# Patient Record
Sex: MALE | Race: WHITE | URBAN - METROPOLITAN AREA
[De-identification: names, ages, dates, MRNs, and addresses within clinical notes are randomized per-mention and may not be internally consistent; named-entity substitution may affect disease eponyms.]

---

## 2021-02-25 ENCOUNTER — NEW PATIENT (OUTPATIENT)
Dept: URBAN - METROPOLITAN AREA CLINIC 51 | Facility: CLINIC | Age: 61
End: 2021-02-25
Payer: COMMERCIAL

## 2021-02-25 DIAGNOSIS — E11.9 TYPE 2 DIABETES MELLITUS WITHOUT COMPLICATIONS: Primary | ICD-10-CM

## 2021-02-25 DIAGNOSIS — H25.13 AGE-RELATED NUCLEAR CATARACT, BILATERAL: ICD-10-CM

## 2021-02-25 PROCEDURE — 92250 FUNDUS PHOTOGRAPHY W/I&R: CPT | Performed by: OPTOMETRIST

## 2021-02-25 PROCEDURE — 99204 OFFICE O/P NEW MOD 45 MIN: CPT | Performed by: OPTOMETRIST

## 2021-02-25 ASSESSMENT — KERATOMETRY
OD: 42.01
OS: 42.42

## 2021-02-25 ASSESSMENT — INTRAOCULAR PRESSURE
OS: 12
OD: 10

## 2021-02-25 ASSESSMENT — VISUAL ACUITY
OD: 20/20
OS: 20/20

## 2021-03-18 ENCOUNTER — FOLLOW UP ESTABLISHED (OUTPATIENT)
Dept: URBAN - METROPOLITAN AREA CLINIC 51 | Facility: CLINIC | Age: 61
End: 2021-03-18
Payer: COMMERCIAL

## 2021-03-18 DIAGNOSIS — H21.233 DEGENERATION OF IRIS (PIGMENTARY), BILATERAL: Primary | ICD-10-CM

## 2021-03-18 PROCEDURE — 92133 CPTRZD OPH DX IMG PST SGM ON: CPT | Performed by: OPTOMETRIST

## 2021-03-18 PROCEDURE — 76514 ECHO EXAM OF EYE THICKNESS: CPT | Performed by: OPTOMETRIST

## 2021-03-18 PROCEDURE — 92012 INTRM OPH EXAM EST PATIENT: CPT | Performed by: OPTOMETRIST

## 2021-03-18 PROCEDURE — 92083 EXTENDED VISUAL FIELD XM: CPT | Performed by: OPTOMETRIST

## 2021-03-18 ASSESSMENT — INTRAOCULAR PRESSURE
OS: 17
OD: 18

## 2021-09-22 ENCOUNTER — OFFICE VISIT (OUTPATIENT)
Dept: URBAN - METROPOLITAN AREA CLINIC 51 | Facility: CLINIC | Age: 61
End: 2021-09-22
Payer: COMMERCIAL

## 2021-09-22 DIAGNOSIS — H52.4 PRESBYOPIA: ICD-10-CM

## 2021-09-22 DIAGNOSIS — Z79.84 LONG TERM (CURRENT) USE OF ORAL ANTIDIABETIC DRUGS: ICD-10-CM

## 2021-09-22 DIAGNOSIS — E11.3292 TYPE 2 DIAB W MILD NONPRLF DIABETIC RTNOP W/O MACULAR EDEMA, LEFT EYE: Primary | ICD-10-CM

## 2021-09-22 PROCEDURE — 92134 CPTRZ OPH DX IMG PST SGM RTA: CPT | Performed by: OPTOMETRIST

## 2021-09-22 PROCEDURE — 92250 FUNDUS PHOTOGRAPHY W/I&R: CPT | Performed by: OPTOMETRIST

## 2021-09-22 PROCEDURE — 92014 COMPRE OPH EXAM EST PT 1/>: CPT | Performed by: OPTOMETRIST

## 2021-09-22 PROCEDURE — 92083 EXTENDED VISUAL FIELD XM: CPT | Performed by: OPTOMETRIST

## 2021-09-22 PROCEDURE — 92133 CPTRZD OPH DX IMG PST SGM ON: CPT | Performed by: OPTOMETRIST

## 2021-09-22 ASSESSMENT — KERATOMETRY
OS: 42.27
OD: 41.95

## 2021-09-22 ASSESSMENT — VISUAL ACUITY
OS: 20/20
OD: 20/20

## 2021-09-22 ASSESSMENT — INTRAOCULAR PRESSURE
OS: 16
OD: 17

## 2021-09-22 NOTE — IMPRESSION/PLAN
Impression: Type 2 diab w mild nonprlf diabetic rtnop w/o macular edema, left eye: M58.3687. *No diabetic retinopathy or macular edema OD Plan: Mild Non-Proliferative Diabetic Retinopathy OS only, no Diabetic Macular Edema and no Neovascularization of the iris, disc, or elsewhere. Discussed ocular and systemic benefits of blood sugar control. Continue f/u care with PCP, letter sent. 1 year Diabetic eye exam + MAC OCT + FUNDUS PHOTOS.

## 2021-09-22 NOTE — IMPRESSION/PLAN
Impression: Age-related nuclear cataract, bilateral Plan: Educated patient on findings. Cataracts are mild and not visually significant or effecting ADLs/vision at this time. No treatment is currently warranted due to current level of vision. Patient will monitor vision closely and contact our office with any changes/ worsening in vision. Will re-evaluate on return visit.

## 2021-09-22 NOTE — IMPRESSION/PLAN
Impression: Degeneration of iris (pigmentary), bilateral
*RNFL (03/18/2021) OD: abn (12), OS: bdl (12) *GCC: WNL OU *HVF 24-2 (03/18/2021) OD: sup edge pts OS: sup/nasal edge pts *Pachymetry (03/18/2021) 547/541 Plan: IOPs today 17/16 w/o meds RNFL (09/22/2021) OD: bdl sup ; OS: abn sup HVF 24-2 (09/22/2021) OD: superior peripheral pts, no glc defect OS: superior edge pts, no glc defect RNFL shows borderline findings superior OS>OD, with mild chg in thickness. Educated patient on risk for glaucoma and will continue to monitor closely. No treatment at this time. 
RTC in 6 months for CE + 24-2 HVF + RNFL

## 2021-09-22 NOTE — IMPRESSION/PLAN
Impression: Corneal scar: H17.9. Plan: Old FB scar from foreign body (metal) removal. Recommend AT's for comfort. Not affecting BCVA.

## 2022-03-22 ENCOUNTER — OFFICE VISIT (OUTPATIENT)
Dept: URBAN - METROPOLITAN AREA CLINIC 51 | Facility: CLINIC | Age: 62
End: 2022-03-22
Payer: COMMERCIAL

## 2022-03-22 DIAGNOSIS — H17.9 CORNEAL SCAR: ICD-10-CM

## 2022-03-22 DIAGNOSIS — E11.3291 TYPE 2 DIAB W MILD NONPRLF DIABETIC RTNOP W/O MACULAR EDEMA, RIGHT EYE: Primary | ICD-10-CM

## 2022-03-22 DIAGNOSIS — H25.811 COMBINED FORMS OF AGE-RELATED CATARACT, RIGHT EYE: ICD-10-CM

## 2022-03-22 DIAGNOSIS — H43.313 VITREOUS MEMBRANES AND STRANDS, BILATERAL: ICD-10-CM

## 2022-03-22 DIAGNOSIS — H25.12 AGE-RELATED NUCLEAR CATARACT, LEFT EYE: ICD-10-CM

## 2022-03-22 DIAGNOSIS — E11.3212 TYPE 2 DIAB W MILD NONPRLF DIABETIC RTNOP W MACULAR EDEMA, LEFT EYE: ICD-10-CM

## 2022-03-22 PROCEDURE — 92134 CPTRZ OPH DX IMG PST SGM RTA: CPT | Performed by: OPTOMETRIST

## 2022-03-22 PROCEDURE — 92250 FUNDUS PHOTOGRAPHY W/I&R: CPT | Performed by: OPTOMETRIST

## 2022-03-22 PROCEDURE — 92014 COMPRE OPH EXAM EST PT 1/>: CPT | Performed by: OPTOMETRIST

## 2022-03-22 PROCEDURE — 92133 CPTRZD OPH DX IMG PST SGM ON: CPT | Performed by: OPTOMETRIST

## 2022-03-22 PROCEDURE — 92083 EXTENDED VISUAL FIELD XM: CPT | Performed by: OPTOMETRIST

## 2022-03-22 ASSESSMENT — KERATOMETRY
OD: 42.13
OS: 42.56

## 2022-03-22 ASSESSMENT — INTRAOCULAR PRESSURE
OS: 16
OD: 17

## 2022-03-22 ASSESSMENT — VISUAL ACUITY
OD: 20/20
OS: 20/20

## 2022-03-22 NOTE — IMPRESSION/PLAN
Impression: Degeneration of iris (pigmentary), bilateral
*RNFL (09/22/2021) OD: bdl sup ; OS: abn sup *RNFL (03/18/2021) OD: abn (12), OS: bdl (12) *GCC: WNL OU *HVF 24-2 (09/22/2021) OD: superior peripheral pts, no glc defect OS: superior edge pts, no glc defect *HVF 24-2 (03/18/2021) OD: sup edge pts OS: sup/nasal edge pts *Pachymetry (03/18/2021) 547/541 Plan: IOPs today 17/16 w/o meds RNFL OCT (03/22/2022): bdl sup, suggests WNL overall OU
GCA OCT (03/22/2022): WNL OU
HVF 24-2 (03/22/2022) OD: essentially full, edge pts  ;   OS: essentially full Diagnostic testing remains stable since last year. Educated patient on risk for glaucoma and will continue to monitor closely. No treatment at this time. Continue to monitor with HVF + RNFL/GCA.

## 2022-03-22 NOTE — IMPRESSION/PLAN
Impression: Combined forms of age-related cataract, right eye: H25.811. Plan: Educated patient on condition and findings OD>OS. Cataracts are not visually significant or effecting ADLs/vision. No treatment is currently warranted due to current level of vision. Patient to contact our office with any changes/ worsening in vision. Will re-evaluate on return visit.

## 2022-03-22 NOTE — IMPRESSION/PLAN
Impression: Type 2 diab w mild nonprlf diabetic rtnop w/o macular edema, right eye: L40.1694. Plan: Mild Non-Proliferative Diabetic Retinopathy, no DME OD. MAC OCT and FUNDUS PHOTOS taken today. Discussed ocular and systemic benefits of blood sugar control. Continue f/u care with PCP. Send report to PCP. RTC in 2-3 months for CE + MAC OCT + FUNDUS PHOTOS.

## 2022-03-22 NOTE — IMPRESSION/PLAN
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 Plan: Mild Non-Proliferative Diabetic Retinopathy with mild DME OS. MAC OCT and FUNDUS PHOTOS taken today. Discussed ocular and systemic benefits of blood sugar control. Continue f/u care with PCP. Send report to PCP. RTC in 2-3 months for CE + MAC OCT + FUNDUS PHOTOS.

## 2022-03-22 NOTE — IMPRESSION/PLAN
Impression: Corneal scar: H17.9. Plan: Old FB scar from foreign body (metal) removal. Not affecting BCVA. AT's for comfort.

## 2022-03-22 NOTE — IMPRESSION/PLAN
Impression: Age-related nuclear cataract, left eye: H25.12. Plan: Educated patient on condition and findings OD>OS. Cataracts are not visually significant or effecting ADLs/vision. No treatment is currently warranted due to current level of vision. Patient to contact our office with any changes/ worsening in vision. Will re-evaluate on return visit.

## 2022-06-15 ENCOUNTER — OFFICE VISIT (OUTPATIENT)
Dept: URBAN - METROPOLITAN AREA CLINIC 51 | Facility: CLINIC | Age: 62
End: 2022-06-15
Payer: COMMERCIAL

## 2022-06-15 DIAGNOSIS — H52.4 PRESBYOPIA: ICD-10-CM

## 2022-06-15 DIAGNOSIS — H25.811 COMBINED FORMS OF AGE-RELATED CATARACT, RIGHT EYE: ICD-10-CM

## 2022-06-15 DIAGNOSIS — Z79.84 LONG TERM (CURRENT) USE OF ORAL ANTIDIABETIC DRUGS: ICD-10-CM

## 2022-06-15 DIAGNOSIS — H25.12 AGE-RELATED NUCLEAR CATARACT, LEFT EYE: ICD-10-CM

## 2022-06-15 DIAGNOSIS — E11.3212 TYPE 2 DIAB W MILD NONPRLF DIABETIC RTNOP W MACULAR EDEMA, LEFT EYE: Primary | ICD-10-CM

## 2022-06-15 DIAGNOSIS — H43.313 VITREOUS MEMBRANES AND STRANDS, BILATERAL: ICD-10-CM

## 2022-06-15 DIAGNOSIS — H21.233 DEGENERATION OF IRIS (PIGMENTARY), BILATERAL: ICD-10-CM

## 2022-06-15 DIAGNOSIS — E11.3291 TYPE 2 DIAB W MILD NONPRLF DIABETIC RTNOP W/O MACULAR EDEMA, RIGHT EYE: ICD-10-CM

## 2022-06-15 DIAGNOSIS — H17.9 CORNEAL SCAR: ICD-10-CM

## 2022-06-15 PROCEDURE — 92134 CPTRZ OPH DX IMG PST SGM RTA: CPT | Performed by: OPTOMETRIST

## 2022-06-15 PROCEDURE — 92014 COMPRE OPH EXAM EST PT 1/>: CPT | Performed by: OPTOMETRIST

## 2022-06-15 PROCEDURE — 92250 FUNDUS PHOTOGRAPHY W/I&R: CPT | Performed by: OPTOMETRIST

## 2022-06-15 RX ORDER — METFORMIN HYDROCHLORIDE 500 MG/1
500 MG TABLET, FILM COATED ORAL
Qty: 0 | Refills: 0 | Status: ACTIVE
Start: 2022-06-15

## 2022-06-15 ASSESSMENT — VISUAL ACUITY
OS: 20/20
OD: 20/20

## 2022-06-15 ASSESSMENT — INTRAOCULAR PRESSURE
OS: 14
OD: 15

## 2022-06-15 NOTE — IMPRESSION/PLAN
Impression: Age-related nuclear cataract, left eye: H25.12. Plan: Educated patient on condition and findings OD>OS. Cataracts are not visually significant or effecting ADLs/vision. No treatment is currently warranted due to current level of vision. Patient to contact our office with any changes/ worsening in vision. Monitor.
Impression: Combined forms of age-related cataract, right eye: H25.811. Plan: Educated patient on condition and findings OD>OS. Cataracts are not visually significant or effecting ADLs/vision. No treatment is currently warranted due to current level of vision. Patient to contact our office with any changes/ worsening in vision. Monitor.
Impression: Corneal scar: H17.9. Plan: Old FB scar from foreign body (metal) removal. Not affecting BCVA. AT's for comfort. Monitor.
Impression: Degeneration of iris (pigmentary), bilateral
*RNFL OCT (03/22/2022): bdl sup, suggests WNL overall OU *RNFL (09/22/2021) OD: bdl sup ; OS: abn sup *RNFL (03/18/2021) OD: abn (12), OS: bdl (12) *GCA OCT (03/22/2022): WNL OU *GCC: WNL OU *HVF 24-2 (03/22/2022) OD: essentially full, edge pts  ;   OS: essentially full *HVF 24-2 (09/22/2021) OD: superior peripheral pts, no glc defect OS: superior edge pts, no glc defect *HVF 24-2 (03/18/2021) OD: sup edge pts OS: sup/nasal edge pts *Pachymetry (03/18/2021) 547/541 Plan: IOPs today: 15/14 Reviewed today's findings. IOP remains stable. Educated patient on risk for glaucoma and will continue to monitor closely. No treatment at this time. 
Will obtain updated RNFL/GCA at next visit in 5-6 months
Impression: Long term (current) use of oral antidiabetic drugs Plan: See other diabetic plan.
Impression: Presbyopia: H52.4. Plan: Patient is satisfied with current level of vision and declined new SRx.
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 Plan: Mild Non-Proliferative Diabetic Retinopathy with mild DME OS. MAC OCT and FUNDUS PHOTOS taken today. Discussed ocular and systemic benefits of blood sugar control. Continue f/u care with PCP. Send report to PCP. RTC in 5-6 months for CE + MAC OCT + FUNDUS PHOTOS.
Impression: Type 2 diab w mild nonprlf diabetic rtnop w/o macular edema, right eye: I91.7025. Plan: Mild Non-Proliferative Diabetic Retinopathy, no DME OD. MAC OCT and FUNDUS PHOTOS taken today. Discussed ocular and systemic benefits of blood sugar control. Continue f/u care with PCP. Send report to PCP. RTC in 5-6 months for CE + MAC OCT + FUNDUS PHOTOS.
Impression: Vitreous membranes and strands, bilateral: H43.313. Plan: Retina is attached 360 degrees. Patient to RTC ASAP if increase in floaters, flashes, or curtain or veil taking away vision. Monitor.
Detail Level: Detailed
Quality 111:Pneumonia Vaccination Status For Older Adults: Pneumococcal Vaccination Previously Received
Quality 130: Documentation Of Current Medications In The Medical Record: Current Medications Documented

## 2022-11-18 ENCOUNTER — OFFICE VISIT (OUTPATIENT)
Dept: URBAN - METROPOLITAN AREA CLINIC 51 | Facility: CLINIC | Age: 62
End: 2022-11-18
Payer: COMMERCIAL

## 2022-11-18 DIAGNOSIS — H25.811 COMBINED FORMS OF AGE-RELATED CATARACT, RIGHT EYE: ICD-10-CM

## 2022-11-18 DIAGNOSIS — H21.233 DEGENERATION OF IRIS (PIGMENTARY), BILATERAL: ICD-10-CM

## 2022-11-18 DIAGNOSIS — Z79.84 LONG TERM (CURRENT) USE OF ORAL ANTIDIABETIC DRUGS: ICD-10-CM

## 2022-11-18 DIAGNOSIS — H17.9 CORNEAL SCAR: ICD-10-CM

## 2022-11-18 DIAGNOSIS — E11.3212 TYPE 2 DIAB W MILD NONPRLF DIABETIC RTNOP W MACULAR EDEMA, LEFT EYE: Primary | ICD-10-CM

## 2022-11-18 DIAGNOSIS — H43.313 VITREOUS MEMBRANES AND STRANDS, BILATERAL: ICD-10-CM

## 2022-11-18 DIAGNOSIS — E11.3291 TYPE 2 DIAB W MILD NONPRLF DIABETIC RTNOP W/O MACULAR EDEMA, RIGHT EYE: ICD-10-CM

## 2022-11-18 DIAGNOSIS — H25.12 AGE-RELATED NUCLEAR CATARACT, LEFT EYE: ICD-10-CM

## 2022-11-18 PROCEDURE — 92133 CPTRZD OPH DX IMG PST SGM ON: CPT | Performed by: OPTOMETRIST

## 2022-11-18 PROCEDURE — 99214 OFFICE O/P EST MOD 30 MIN: CPT | Performed by: OPTOMETRIST

## 2022-11-18 PROCEDURE — 92134 CPTRZ OPH DX IMG PST SGM RTA: CPT | Performed by: OPTOMETRIST

## 2022-11-18 ASSESSMENT — INTRAOCULAR PRESSURE
OS: 14
OD: 14

## 2022-11-18 ASSESSMENT — VISUAL ACUITY
OD: 20/20
OS: 20/20

## 2022-11-18 ASSESSMENT — KERATOMETRY
OD: 41.77
OS: 42.32

## 2022-11-18 NOTE — IMPRESSION/PLAN
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 Plan: Mild Non-Proliferative Diabetic Retinopathy, no DME OD. MAC OCT taken today. Discussed ocular and systemic benefits of blood sugar control. Continue f/u care with PCP. Send notes to PCP. Monitor with MAC OCT + FUNDUS PHOTOS.

## 2022-11-18 NOTE — IMPRESSION/PLAN
Impression: Degeneration of iris (pigmentary), bilateral
*RNFL OCT (03/22/2022): bdl sup, suggests WNL overall OU *RNFL (09/22/2021) OD: bdl sup ; OS: abn sup *RNFL (03/18/2021) OD: abn (12), OS: bdl (12) *GCA OCT (03/22/2022): WNL OU *GCC: WNL OU *HVF 24-2 (03/22/2022) OD: essentially full, edge pts  ;   OS: essentially full *HVF 24-2 (09/22/2021) OD: superior peripheral pts, no glc defect OS: superior edge pts, no glc defect *HVF 24-2 (03/18/2021) OD: sup edge pts OS: sup/nasal edge pts *Pachymetry (03/18/2021) 547/541 Plan: IOPs today: 14/14 RNFL OCT (11/18/2022): poor scan - not over nerve OU
GCA OCT (11/18/2022): WNL OU Reviewed today's findings. IOP remains stable. RNFL OCT poor scan/unreliable. Educated patient on risk for glaucoma and will continue to monitor closely. No treatment at this time. Will obtain updated HVF at next visit. 

RTC in 6 months for 24-2c HVF + IOP check

## 2022-11-18 NOTE — IMPRESSION/PLAN
Impression: Vitreous membranes and strands, bilateral: H43.313. Plan: Retina is attached 360 degrees with no signs of any retinal holes, tears or detachments. Patient to RTC ASAP if increase in floaters, flashes, or curtain or veil taking away vision. Monitor.

## 2022-11-18 NOTE — IMPRESSION/PLAN
Impression: Combined forms of age-related cataract, right eye: H25.811. Plan: Educated patient on condition and findings OD>OS. Cataracts are not visually significant or effecting ADLs/vision enough to warrant surgical intervention at this time. Patient to contact our office with any changes/ worsening in vision. Monitor.

## 2022-11-18 NOTE — IMPRESSION/PLAN
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 Plan: Mild Non-Proliferative Diabetic Retinopathy with mild worsening DME OS. DME is not having any affect on patients vision, however, due no regression since onset 3/2022; recommend referring to retina for further evaluation. MAC OCT taken today. Discussed ocular and systemic benefits of blood sugar control. Continue f/u care with PCP. Send report to PCP. Refer to retina for further evaluation

## 2022-11-18 NOTE — IMPRESSION/PLAN
Impression: Corneal scar: H17.9. Plan: Old FB scar from foreign body (metal) removal. Not affecting BCVA. Recommend AT's for comfort. Monitor.

## 2022-11-18 NOTE — IMPRESSION/PLAN
Impression: Age-related nuclear cataract, left eye: H25.12. Plan: Educated patient on condition and findings OD>OS. Cataracts are not visually significant or effecting ADLs/vision enough to warrant surgical intervention at this time. Patient to contact our office with any changes/ worsening in vision. Monitor.

## 2023-01-03 ENCOUNTER — OFFICE VISIT (OUTPATIENT)
Dept: URBAN - METROPOLITAN AREA CLINIC 51 | Facility: CLINIC | Age: 63
End: 2023-01-03
Payer: COMMERCIAL

## 2023-01-03 DIAGNOSIS — H25.13 AGE-RELATED NUCLEAR CATARACT, BILATERAL: ICD-10-CM

## 2023-01-03 DIAGNOSIS — E11.3312 TYPE 2 DIAB W MODERATE NONPRLF DIAB RTNOP W MACULAR EDEMA, LEFT EYE: Primary | ICD-10-CM

## 2023-01-03 DIAGNOSIS — E11.3391 TYPE 2 DIAB W MODERATE NONPRLF DIAB RTNOP W/O MACULAR EDEMA, RIGHT EYE: ICD-10-CM

## 2023-01-03 DIAGNOSIS — H43.393 OTHER VITREOUS OPACITIES, BILATERAL: ICD-10-CM

## 2023-01-03 DIAGNOSIS — E11.39 TYPE 2 DIABETES W OTH DIABETIC OPHTHALMIC COMPLICATION: ICD-10-CM

## 2023-01-03 PROCEDURE — 92004 COMPRE OPH EXAM NEW PT 1/>: CPT | Performed by: OPHTHALMOLOGY

## 2023-01-03 PROCEDURE — 92134 CPTRZ OPH DX IMG PST SGM RTA: CPT | Performed by: OPHTHALMOLOGY

## 2023-01-03 PROCEDURE — 92235 FLUORESCEIN ANGRPH MLTIFRAME: CPT | Performed by: OPHTHALMOLOGY

## 2023-01-03 ASSESSMENT — INTRAOCULAR PRESSURE
OD: 12
OS: 10

## 2023-01-03 NOTE — IMPRESSION/PLAN
Impression: Type 2 diabetes w oth diabetic ophthalmic complication: S58.26. Plan: Reviewed the presence of diabetic retinopathy. control DM to reduce the risks of progression.  some carotid suspicion, recommend workup with PCP

## 2023-01-03 NOTE — IMPRESSION/PLAN
Impression: Type 2 diab w moderate nonprlf diab rtnop w macular edema, left eye: Y95.4100. Plan: mild edema, no NV. explained in detail with patient, recommend observation for now. call if any changes RTC 6 month poss FA OS

## 2023-01-03 NOTE — IMPRESSION/PLAN
Impression: Type 2 diab w moderate nonprlf diab rtnop w/o macular edema, right eye: T01.7278.  Plan: no edema, no NV. monitor for now

## 2024-06-04 ENCOUNTER — OFFICE VISIT (OUTPATIENT)
Dept: URBAN - METROPOLITAN AREA CLINIC 51 | Facility: CLINIC | Age: 64
End: 2024-06-04
Payer: COMMERCIAL

## 2024-06-04 DIAGNOSIS — E11.3312 TYPE 2 DIABETES MELLITUS WITH MODERATE NONPROLIFERATIVE DIABETIC RETINOPATHY WITH MACULAR EDEMA, LEFT EYE: Primary | ICD-10-CM

## 2024-06-04 DIAGNOSIS — H25.13 AGE-RELATED NUCLEAR CATARACT, BILATERAL: ICD-10-CM

## 2024-06-04 DIAGNOSIS — Z79.84 LONG TERM (CURRENT) USE OF ORAL ANTIDIABETIC DRUGS: ICD-10-CM

## 2024-06-04 DIAGNOSIS — E11.3391 TYPE 2 DIAB W MODERATE NONPRLF DIAB RTNOP W/O MACULAR EDEMA, RIGHT EYE: ICD-10-CM

## 2024-06-04 DIAGNOSIS — H21.233 DEGENERATION OF IRIS (PIGMENTARY), BILATERAL: ICD-10-CM

## 2024-06-04 DIAGNOSIS — H43.313 VITREOUS MEMBRANES AND STRANDS, BILATERAL: ICD-10-CM

## 2024-06-04 DIAGNOSIS — H17.9 CORNEAL SCAR: ICD-10-CM

## 2024-06-04 DIAGNOSIS — E11.39 TYPE 2 DIABETES W OTH DIABETIC OPHTHALMIC COMPLICATION: ICD-10-CM

## 2024-06-04 PROCEDURE — 99214 OFFICE O/P EST MOD 30 MIN: CPT | Performed by: OPTOMETRIST

## 2024-06-04 PROCEDURE — 92134 CPTRZ OPH DX IMG PST SGM RTA: CPT | Performed by: OPTOMETRIST

## 2024-06-04 ASSESSMENT — VISUAL ACUITY
OD: 20/25
OS: 20/20

## 2024-06-04 ASSESSMENT — INTRAOCULAR PRESSURE
OS: 14
OD: 15

## 2024-07-02 ENCOUNTER — OFFICE VISIT (OUTPATIENT)
Dept: URBAN - METROPOLITAN AREA CLINIC 51 | Facility: CLINIC | Age: 64
End: 2024-07-02
Payer: COMMERCIAL

## 2024-07-02 DIAGNOSIS — H25.13 AGE-RELATED NUCLEAR CATARACT, BILATERAL: ICD-10-CM

## 2024-07-02 DIAGNOSIS — H34.8320 TRIB RTNL VEIN OCCLUSION, LEFT EYE, WITH MACULAR EDEMA: Primary | ICD-10-CM

## 2024-07-02 DIAGNOSIS — E11.3391 TYPE 2 DIAB W MODERATE NONPRLF DIAB RTNOP W/O MACULAR EDEMA, RIGHT EYE: ICD-10-CM

## 2024-07-02 DIAGNOSIS — E11.3312 TYPE 2 DIAB W MODERATE NONPRLF DIAB RTNOP W MACULAR EDEMA, LEFT EYE: ICD-10-CM

## 2024-07-02 PROCEDURE — 92235 FLUORESCEIN ANGRPH MLTIFRAME: CPT | Performed by: OPHTHALMOLOGY

## 2024-07-02 PROCEDURE — 67028 INJECTION EYE DRUG: CPT | Performed by: OPHTHALMOLOGY

## 2024-07-02 PROCEDURE — 92014 COMPRE OPH EXAM EST PT 1/>: CPT | Performed by: OPHTHALMOLOGY

## 2024-07-02 PROCEDURE — 92134 CPTRZ OPH DX IMG PST SGM RTA: CPT | Performed by: OPHTHALMOLOGY

## 2024-07-02 ASSESSMENT — INTRAOCULAR PRESSURE
OS: 9
OD: 14

## 2024-07-30 ENCOUNTER — OFFICE VISIT (OUTPATIENT)
Dept: URBAN - METROPOLITAN AREA CLINIC 51 | Facility: CLINIC | Age: 64
End: 2024-07-30
Payer: COMMERCIAL

## 2024-07-30 DIAGNOSIS — E11.3312 TYPE 2 DIABETES MELLITUS WITH MODERATE NONPROLIFERATIVE DIABETIC RETINOPATHY WITH MACULAR EDEMA, LEFT EYE: ICD-10-CM

## 2024-07-30 DIAGNOSIS — H34.8320 TRIB RTNL VEIN OCCLUSION, LEFT EYE, WITH MACULAR EDEMA: Primary | ICD-10-CM

## 2024-07-30 PROCEDURE — 67028 INJECTION EYE DRUG: CPT | Performed by: OPHTHALMOLOGY

## 2024-07-30 PROCEDURE — 92134 CPTRZ OPH DX IMG PST SGM RTA: CPT | Performed by: OPHTHALMOLOGY

## 2024-07-30 ASSESSMENT — INTRAOCULAR PRESSURE
OS: 11
OD: 14

## 2024-08-27 ENCOUNTER — OFFICE VISIT (OUTPATIENT)
Dept: URBAN - METROPOLITAN AREA CLINIC 51 | Facility: CLINIC | Age: 64
End: 2024-08-27
Payer: COMMERCIAL

## 2024-08-27 DIAGNOSIS — E11.3312 TYPE 2 DIABETES MELLITUS WITH MODERATE NONPROLIFERATIVE DIABETIC RETINOPATHY WITH MACULAR EDEMA, LEFT EYE: ICD-10-CM

## 2024-08-27 DIAGNOSIS — H34.8320 TRIB RTNL VEIN OCCLUSION, LEFT EYE, WITH MACULAR EDEMA: Primary | ICD-10-CM

## 2024-08-27 PROCEDURE — 92134 CPTRZ OPH DX IMG PST SGM RTA: CPT | Performed by: OPHTHALMOLOGY

## 2024-08-27 PROCEDURE — 67028 INJECTION EYE DRUG: CPT | Performed by: OPHTHALMOLOGY

## 2024-08-27 ASSESSMENT — INTRAOCULAR PRESSURE
OS: 10
OD: 7

## 2024-09-24 ENCOUNTER — OFFICE VISIT (OUTPATIENT)
Dept: URBAN - METROPOLITAN AREA CLINIC 51 | Facility: CLINIC | Age: 64
End: 2024-09-24
Payer: COMMERCIAL

## 2024-09-24 DIAGNOSIS — H34.8320 TRIB RTNL VEIN OCCLUSION, LEFT EYE, WITH MACULAR EDEMA: Primary | ICD-10-CM

## 2024-09-24 DIAGNOSIS — E11.3312 TYPE 2 DIAB W MODERATE NONPRLF DIAB RTNOP W MACULAR EDEMA, LEFT EYE: ICD-10-CM

## 2024-09-24 DIAGNOSIS — E11.3391 TYPE 2 DIAB W MODERATE NONPRLF DIAB RTNOP W/O MACULAR EDEMA, RIGHT EYE: ICD-10-CM

## 2024-09-24 DIAGNOSIS — H25.13 AGE-RELATED NUCLEAR CATARACT, BILATERAL: ICD-10-CM

## 2024-09-24 PROCEDURE — 92235 FLUORESCEIN ANGRPH MLTIFRAME: CPT | Performed by: OPHTHALMOLOGY

## 2024-09-24 PROCEDURE — 92014 COMPRE OPH EXAM EST PT 1/>: CPT | Performed by: OPHTHALMOLOGY

## 2024-09-24 PROCEDURE — 67028 INJECTION EYE DRUG: CPT | Performed by: OPHTHALMOLOGY

## 2024-09-24 PROCEDURE — 92134 CPTRZ OPH DX IMG PST SGM RTA: CPT | Performed by: OPHTHALMOLOGY

## 2024-09-24 ASSESSMENT — INTRAOCULAR PRESSURE
OD: 7
OS: 9

## 2024-10-22 ENCOUNTER — OFFICE VISIT (OUTPATIENT)
Dept: URBAN - METROPOLITAN AREA CLINIC 51 | Facility: CLINIC | Age: 64
End: 2024-10-22
Payer: COMMERCIAL

## 2024-10-22 DIAGNOSIS — E11.3312 TYPE 2 DIABETES MELLITUS WITH MODERATE NONPROLIFERATIVE DIABETIC RETINOPATHY WITH MACULAR EDEMA, LEFT EYE: ICD-10-CM

## 2024-10-22 DIAGNOSIS — H34.8320 TRIB RTNL VEIN OCCLUSION, LEFT EYE, WITH MACULAR EDEMA: Primary | ICD-10-CM

## 2024-10-22 PROCEDURE — 92134 CPTRZ OPH DX IMG PST SGM RTA: CPT | Performed by: OPHTHALMOLOGY

## 2024-10-22 PROCEDURE — 67028 INJECTION EYE DRUG: CPT | Performed by: OPHTHALMOLOGY

## 2024-10-22 ASSESSMENT — INTRAOCULAR PRESSURE
OS: 15
OD: 13

## 2024-11-19 ENCOUNTER — OFFICE VISIT (OUTPATIENT)
Dept: URBAN - METROPOLITAN AREA CLINIC 51 | Facility: CLINIC | Age: 64
End: 2024-11-19
Payer: COMMERCIAL

## 2024-11-19 DIAGNOSIS — H34.8320 TRIB RTNL VEIN OCCLUSION, LEFT EYE, WITH MACULAR EDEMA: Primary | ICD-10-CM

## 2024-11-19 DIAGNOSIS — E11.3312 TYPE 2 DIABETES MELLITUS WITH MODERATE NONPROLIFERATIVE DIABETIC RETINOPATHY WITH MACULAR EDEMA, LEFT EYE: ICD-10-CM

## 2024-11-19 PROCEDURE — 67028 INJECTION EYE DRUG: CPT | Performed by: OPHTHALMOLOGY

## 2024-11-19 PROCEDURE — 92134 CPTRZ OPH DX IMG PST SGM RTA: CPT | Performed by: OPHTHALMOLOGY

## 2024-11-19 ASSESSMENT — INTRAOCULAR PRESSURE
OS: 10
OD: 13

## 2024-12-17 ENCOUNTER — OFFICE VISIT (OUTPATIENT)
Dept: URBAN - METROPOLITAN AREA CLINIC 51 | Facility: CLINIC | Age: 64
End: 2024-12-17
Payer: COMMERCIAL

## 2024-12-17 DIAGNOSIS — H34.8320 TRIBUTARY (BRANCH) RETINAL VEIN OCCLUSION, LEFT EYE, WITH MACULAR EDEMA: Primary | ICD-10-CM

## 2024-12-17 DIAGNOSIS — E11.3312 TYPE 2 DIABETES MELLITUS WITH MODERATE NONPROLIFERATIVE DIABETIC RETINOPATHY WITH MACULAR EDEMA, LEFT EYE: ICD-10-CM

## 2024-12-17 PROCEDURE — 67028 INJECTION EYE DRUG: CPT | Performed by: OPHTHALMOLOGY

## 2024-12-17 PROCEDURE — 92134 CPTRZ OPH DX IMG PST SGM RTA: CPT | Performed by: OPHTHALMOLOGY

## 2024-12-17 ASSESSMENT — INTRAOCULAR PRESSURE
OS: 16
OD: 10

## 2025-01-14 ENCOUNTER — OFFICE VISIT (OUTPATIENT)
Dept: URBAN - METROPOLITAN AREA CLINIC 51 | Facility: CLINIC | Age: 65
End: 2025-01-14
Payer: COMMERCIAL

## 2025-01-14 DIAGNOSIS — E11.3312 TYPE 2 DIAB W MODERATE NONPRLF DIAB RTNOP W MACULAR EDEMA, LEFT EYE: ICD-10-CM

## 2025-01-14 DIAGNOSIS — H34.8320 TRIBUTARY (BRANCH) RETINAL VEIN OCCLUSION, LEFT EYE, WITH MACULAR EDEMA: Primary | ICD-10-CM

## 2025-01-14 DIAGNOSIS — H25.13 AGE-RELATED NUCLEAR CATARACT, BILATERAL: ICD-10-CM

## 2025-01-14 DIAGNOSIS — E11.3391 TYPE 2 DIAB W MODERATE NONPRLF DIAB RTNOP W/O MACULAR EDEMA, RIGHT EYE: ICD-10-CM

## 2025-01-14 PROCEDURE — 92134 CPTRZ OPH DX IMG PST SGM RTA: CPT | Performed by: OPHTHALMOLOGY

## 2025-01-14 PROCEDURE — 92014 COMPRE OPH EXAM EST PT 1/>: CPT | Performed by: OPHTHALMOLOGY

## 2025-01-14 PROCEDURE — 67028 INJECTION EYE DRUG: CPT | Performed by: OPHTHALMOLOGY

## 2025-01-14 ASSESSMENT — INTRAOCULAR PRESSURE
OD: 11
OS: 16

## 2025-02-25 ENCOUNTER — OFFICE VISIT (OUTPATIENT)
Dept: URBAN - METROPOLITAN AREA CLINIC 51 | Facility: CLINIC | Age: 65
End: 2025-02-25
Payer: COMMERCIAL

## 2025-02-25 DIAGNOSIS — H34.8320 TRIB RTNL VEIN OCCLUSION, LEFT EYE, WITH MACULAR EDEMA: Primary | ICD-10-CM

## 2025-02-25 DIAGNOSIS — E11.3312 TYPE 2 DIABETES MELLITUS WITH MODERATE NONPROLIFERATIVE DIABETIC RETINOPATHY WITH MACULAR EDEMA, LEFT EYE: ICD-10-CM

## 2025-02-25 PROCEDURE — 92134 CPTRZ OPH DX IMG PST SGM RTA: CPT | Performed by: OPHTHALMOLOGY

## 2025-02-25 PROCEDURE — 67028 INJECTION EYE DRUG: CPT | Performed by: OPHTHALMOLOGY

## 2025-02-25 ASSESSMENT — INTRAOCULAR PRESSURE
OD: 12
OS: 17

## 2025-04-08 ENCOUNTER — OFFICE VISIT (OUTPATIENT)
Dept: URBAN - METROPOLITAN AREA CLINIC 51 | Facility: CLINIC | Age: 65
End: 2025-04-08
Payer: COMMERCIAL

## 2025-04-08 DIAGNOSIS — H34.8320 TRIBUTARY (BRANCH) RETINAL VEIN OCCLUSION, LEFT EYE, WITH MACULAR EDEMA: Primary | ICD-10-CM

## 2025-04-08 PROCEDURE — 67028 INJECTION EYE DRUG: CPT | Performed by: OPHTHALMOLOGY

## 2025-04-08 PROCEDURE — 92134 CPTRZ OPH DX IMG PST SGM RTA: CPT | Performed by: OPHTHALMOLOGY

## 2025-04-08 ASSESSMENT — INTRAOCULAR PRESSURE
OD: 12
OS: 12

## 2025-05-20 DIAGNOSIS — H34.8320 TRIBUTARY (BRANCH) RETINAL VEIN OCCLUSION, LEFT EYE, WITH MACULAR EDEMA: Primary | ICD-10-CM

## 2025-05-20 DIAGNOSIS — E11.3391 TYPE 2 DIAB W MODERATE NONPRLF DIAB RTNOP W/O MACULAR EDEMA, RIGHT EYE: ICD-10-CM

## 2025-05-20 DIAGNOSIS — E11.3312 TYPE 2 DIAB W MODERATE NONPRLF DIAB RTNOP W MACULAR EDEMA, LEFT EYE: ICD-10-CM

## 2025-05-20 DIAGNOSIS — H40.023 OPEN ANGLE WITH BORDERLINE FINDINGS, HIGH RISK, BILATERAL: ICD-10-CM

## 2025-05-20 DIAGNOSIS — H25.13 AGE-RELATED NUCLEAR CATARACT, BILATERAL: ICD-10-CM

## 2025-05-20 PROCEDURE — 67028 INJECTION EYE DRUG: CPT | Performed by: OPHTHALMOLOGY

## 2025-05-20 PROCEDURE — 92134 CPTRZ OPH DX IMG PST SGM RTA: CPT | Performed by: OPHTHALMOLOGY

## 2025-05-20 PROCEDURE — 92014 COMPRE OPH EXAM EST PT 1/>: CPT | Performed by: OPHTHALMOLOGY

## 2025-05-20 ASSESSMENT — INTRAOCULAR PRESSURE
OD: 19
OS: 22

## 2025-06-06 ENCOUNTER — OFFICE VISIT (OUTPATIENT)
Dept: URBAN - METROPOLITAN AREA CLINIC 51 | Facility: CLINIC | Age: 65
End: 2025-06-06
Payer: COMMERCIAL

## 2025-06-06 DIAGNOSIS — E11.3391 TYPE 2 DIAB W MODERATE NONPRLF DIAB RTNOP W/O MACULAR EDEMA, RIGHT EYE: ICD-10-CM

## 2025-06-06 DIAGNOSIS — H25.13 AGE-RELATED NUCLEAR CATARACT, BILATERAL: ICD-10-CM

## 2025-06-06 DIAGNOSIS — H17.9 CORNEAL SCAR: ICD-10-CM

## 2025-06-06 DIAGNOSIS — H40.023 OPEN ANGLE WITH BORDERLINE FINDINGS, HIGH RISK, BILATERAL: ICD-10-CM

## 2025-06-06 DIAGNOSIS — Z79.84 LONG TERM (CURRENT) USE OF ORAL ANTIDIABETIC DRUGS: ICD-10-CM

## 2025-06-06 DIAGNOSIS — H21.233 DEGENERATION OF IRIS (PIGMENTARY), BILATERAL: ICD-10-CM

## 2025-06-06 DIAGNOSIS — H34.8320 TRIB RTNL VEIN OCCLUSION, LEFT EYE, WITH MACULAR EDEMA: ICD-10-CM

## 2025-06-06 DIAGNOSIS — E11.3312 TYPE 2 DIABETES MELLITUS WITH MODERATE NONPROLIFERATIVE DIABETIC RETINOPATHY WITH MACULAR EDEMA, LEFT EYE: Primary | ICD-10-CM

## 2025-06-06 PROCEDURE — 92134 CPTRZ OPH DX IMG PST SGM RTA: CPT | Performed by: OPTOMETRIST

## 2025-06-06 PROCEDURE — 92133 CPTRZD OPH DX IMG PST SGM ON: CPT | Performed by: OPTOMETRIST

## 2025-06-06 PROCEDURE — 92014 COMPRE OPH EXAM EST PT 1/>: CPT | Performed by: OPTOMETRIST

## 2025-06-06 ASSESSMENT — VISUAL ACUITY
OD: 20/20
OS: 20/25

## 2025-06-06 ASSESSMENT — KERATOMETRY
OS: 42.63
OD: 0.00

## 2025-06-06 ASSESSMENT — INTRAOCULAR PRESSURE
OS: 19
OD: 16

## 2025-07-15 ENCOUNTER — OFFICE VISIT (OUTPATIENT)
Dept: URBAN - METROPOLITAN AREA CLINIC 51 | Facility: CLINIC | Age: 65
End: 2025-07-15
Payer: COMMERCIAL

## 2025-07-15 DIAGNOSIS — E11.3312 TYPE 2 DIABETES MELLITUS WITH MODERATE NONPROLIFERATIVE DIABETIC RETINOPATHY WITH MACULAR EDEMA, LEFT EYE: Primary | ICD-10-CM

## 2025-07-15 DIAGNOSIS — H34.8320 TRIB RTNL VEIN OCCLUSION, LEFT EYE, WITH MACULAR EDEMA: ICD-10-CM

## 2025-07-15 PROCEDURE — 92134 CPTRZ OPH DX IMG PST SGM RTA: CPT | Performed by: OPHTHALMOLOGY

## 2025-07-15 PROCEDURE — 67028 INJECTION EYE DRUG: CPT | Performed by: OPHTHALMOLOGY

## 2025-07-15 ASSESSMENT — INTRAOCULAR PRESSURE
OD: 16
OS: 16